# Patient Record
Sex: FEMALE | Race: WHITE | URBAN - METROPOLITAN AREA
[De-identification: names, ages, dates, MRNs, and addresses within clinical notes are randomized per-mention and may not be internally consistent; named-entity substitution may affect disease eponyms.]

---

## 2017-04-11 ENCOUNTER — OFFICE VISIT (OUTPATIENT)
Dept: ALLERGY | Facility: CLINIC | Age: 82
End: 2017-04-11

## 2017-04-11 VITALS
TEMPERATURE: 98 F | WEIGHT: 120.81 LBS | DIASTOLIC BLOOD PRESSURE: 84 MMHG | OXYGEN SATURATION: 98 % | SYSTOLIC BLOOD PRESSURE: 132 MMHG | RESPIRATION RATE: 17 BRPM | HEART RATE: 77 BPM

## 2017-04-11 DIAGNOSIS — J30.89 ALLERGIC RHINITIS DUE TO OTHER ALLERGIC TRIGGER, UNSPECIFIED RHINITIS SEASONALITY: ICD-10-CM

## 2017-04-11 DIAGNOSIS — L50.9 URTICARIA: ICD-10-CM

## 2017-04-11 DIAGNOSIS — L29.9 PRURITUS: Primary | ICD-10-CM

## 2017-04-11 PROCEDURE — G0463 HOSPITAL OUTPT CLINIC VISIT: HCPCS | Performed by: ALLERGY & IMMUNOLOGY

## 2017-04-11 PROCEDURE — 99204 OFFICE O/P NEW MOD 45 MIN: CPT | Performed by: ALLERGY & IMMUNOLOGY

## 2017-04-11 RX ORDER — ESOMEPRAZOLE MAGNESIUM 40 MG/1
40 CAPSULE, DELAYED RELEASE ORAL
COMMUNITY
Start: 2016-11-15

## 2017-04-11 RX ORDER — SIMVASTATIN 40 MG
TABLET ORAL
COMMUNITY
Start: 2016-06-02

## 2017-04-11 RX ORDER — ASPIRIN 81 MG/1
81 TABLET ORAL
COMMUNITY
Start: 2011-05-01

## 2017-04-11 RX ORDER — ACETAMINOPHEN 325 MG/1
TABLET ORAL
COMMUNITY

## 2017-04-11 RX ORDER — CETIRIZINE HYDROCHLORIDE 10 MG/1
10 TABLET ORAL
COMMUNITY

## 2017-04-11 RX ORDER — MECLIZINE HYDROCHLORIDE 25 MG/1
25 TABLET ORAL
COMMUNITY
Start: 2014-02-17

## 2017-04-11 RX ORDER — LISINOPRIL 5 MG/1
TABLET ORAL
COMMUNITY
Start: 2016-06-29

## 2017-04-11 NOTE — PATIENT INSTRUCTIONS
Recs: Handouts on urticaria provided and reviewed  Continue with Zyrtec, cetirizine, 10 mg once a night at bedtime as patient feels controlled with it when she takes on a nightly basis  Reviewed potential Xyzal, levo cetirizine in place of Zyrtec if refrac

## 2017-04-11 NOTE — PROGRESS NOTES
Tre Olivo is a 80year old female. HPI:   Patient presents with:   Allergies  Hives: hives noted in the evening    Patient is an 80-year-old female who presents for allergy evaluation with a chief complaint of allergies and  itching    Itching Glorious Hinders  Dura MG Oral Tab  Disp:  Rfl:    Esomeprazole Magnesium 40 MG Oral Capsule Delayed Release Take 40 mg by mouth. Disp:  Rfl:    Cholecalciferol (VITAMIN D) 1000 units Oral Tab Take 1,000 mg by mouth.  Disp:  Rfl:    acetaminophen (TYLENOL) 325 MG Oral Tab Take by non-tender non-distended  Skin/Hair: no unusual rashes present.   Dermatographia screen appears negative  Extremities: no edema, cyanosis, or clubbing  Neurological:Oriented to time, place, person & situation       ASSESSMENT/PLAN:   Assessment  Pruritus  ( in this encounter.        Meds This Visit:    No prescriptions requested or ordered in this encounter       Imaging & Referrals:  None     4/11/2017  Ashley Kingston MD      If medication samples were provided today, they were provided solely for patient

## (undated) NOTE — MR AVS SNAPSHOT
[facility] [place] [address] [place]  [id number] [phone]               Thank you for choosing us for your health care visit with Luisa Bird MD.  We are glad to serve you and happy to provide you with this summary o Generic drug:  aspirin   Take 81 mg by mouth. Esomeprazole Magnesium 40 MG Cpdr   Take 40 mg by mouth.    Commonly known as:  NEXIUM           Lactobacillus Acidophilus Powd   Take by mouth.           lisinopril 5 MG Tabs   Commonly known as:  YEMI DASH eating plan Adopt a diet rich in fruits, vegetables, and low fat dairy products with reduced content of saturated and total fat.    Dietary sodium reduction Reduce dietary sodium intake to <= 100 mmol per day (2.4 g sodium or 6 g sodium chloride)   Aer You don’t need to join a gym. Home exercises work great.  Put more priority on exercise in your life                    Visit [facility] online at  Located within [email or web address]